# Patient Record
Sex: FEMALE | Race: NATIVE HAWAIIAN OR OTHER PACIFIC ISLANDER
[De-identification: names, ages, dates, MRNs, and addresses within clinical notes are randomized per-mention and may not be internally consistent; named-entity substitution may affect disease eponyms.]

---

## 2018-09-27 ENCOUNTER — HOSPITAL ENCOUNTER (OUTPATIENT)
Dept: HOSPITAL 14 - H.OPSURG | Age: 43
Discharge: HOME | End: 2018-09-27
Attending: OBSTETRICS & GYNECOLOGY
Payer: COMMERCIAL

## 2018-09-27 VITALS — RESPIRATION RATE: 18 BRPM

## 2018-09-27 VITALS — TEMPERATURE: 98.2 F | DIASTOLIC BLOOD PRESSURE: 58 MMHG | HEART RATE: 69 BPM | SYSTOLIC BLOOD PRESSURE: 101 MMHG

## 2018-09-27 VITALS — BODY MASS INDEX: 22.3 KG/M2

## 2018-09-27 VITALS — OXYGEN SATURATION: 98 %

## 2018-09-27 DIAGNOSIS — D26.0: ICD-10-CM

## 2018-09-27 DIAGNOSIS — N80.0: ICD-10-CM

## 2018-09-27 DIAGNOSIS — N93.8: ICD-10-CM

## 2018-09-27 DIAGNOSIS — D21.9: ICD-10-CM

## 2018-09-27 DIAGNOSIS — R10.2: Primary | ICD-10-CM

## 2018-09-27 DIAGNOSIS — N80.3: ICD-10-CM

## 2018-09-27 DIAGNOSIS — K37: ICD-10-CM

## 2018-09-27 LAB
BASOPHILS # BLD AUTO: 0.1 K/UL (ref 0–0.2)
BASOPHILS NFR BLD: 0.8 % (ref 0–2)
EOSINOPHIL # BLD AUTO: 0.1 K/UL (ref 0–0.7)
EOSINOPHIL NFR BLD: 1.6 % (ref 0–4)
ERYTHROCYTE [DISTWIDTH] IN BLOOD BY AUTOMATED COUNT: 13.2 % (ref 11.5–14.5)
HGB BLD-MCNC: 13.9 G/DL (ref 12–16)
LYMPHOCYTES # BLD AUTO: 1.8 K/UL (ref 1–4.3)
LYMPHOCYTES NFR BLD AUTO: 27.3 % (ref 20–40)
MCH RBC QN AUTO: 30 PG (ref 27–31)
MCHC RBC AUTO-ENTMCNC: 34 G/DL (ref 33–37)
MCV RBC AUTO: 88.5 FL (ref 81–99)
MONOCYTES # BLD: 0.4 K/UL (ref 0–0.8)
MONOCYTES NFR BLD: 6.4 % (ref 0–10)
NEUTROPHILS # BLD: 4.1 K/UL (ref 1.8–7)
NEUTROPHILS NFR BLD AUTO: 63.9 % (ref 50–75)
NRBC BLD AUTO-RTO: 0 % (ref 0–0)
PLATELET # BLD: 336 K/UL (ref 130–400)
PMV BLD AUTO: 8.1 FL (ref 7.2–11.7)
RBC # BLD AUTO: 4.63 MIL/UL (ref 3.8–5.2)
WBC # BLD AUTO: 6.5 K/UL (ref 4.8–10.8)

## 2018-09-27 PROCEDURE — 52005 CYSTO W/URTRL CATHJ: CPT

## 2018-09-27 PROCEDURE — 36415 COLL VENOUS BLD VENIPUNCTURE: CPT

## 2018-09-27 PROCEDURE — 50949 UNLISTED LAPS PX URETER: CPT

## 2018-09-27 PROCEDURE — 86850 RBC ANTIBODY SCREEN: CPT

## 2018-09-27 PROCEDURE — 88302 TISSUE EXAM BY PATHOLOGIST: CPT

## 2018-09-27 PROCEDURE — 88305 TISSUE EXAM BY PATHOLOGIST: CPT

## 2018-09-27 PROCEDURE — 86900 BLOOD TYPING SEROLOGIC ABO: CPT

## 2018-09-27 PROCEDURE — 85025 COMPLETE CBC W/AUTO DIFF WBC: CPT

## 2018-09-27 PROCEDURE — 44970 LAPAROSCOPY APPENDECTOMY: CPT

## 2018-09-27 NOTE — PCM.OP
Operative Report





- Operative Report


Date of Surgery/Procedure: 09/27/18


Time of Surgery/Procedure: 12:00


Surgeon: Dr. Heber Mckee


Assistant: Dr. Enio Summers


Anesthesia/Sedation: general/Dr. Gonzalez


Pre-Operative Diagnosis: abdominal pain and endometriosis


Post-Operative Diagnosis: intestinal involvemnent


Indication for Surgery: as above


Operative Findings: as above


Procedure/Operation Description: 1-Excision perirectsal endometeriosis.  2-

Appendectomy.  Brief History:This 44 year old woman was already brought to the 

OR by dr. Summers when her nored inrtestinal involvement requiring intraoperative

general surgery consultation.  Description of the Procedure: The patient was 

already brought to the OR by Dr. Summers (separate dictstion). After taking 

control of the roboti console the rectal lesion was incised circumferentially 

and with blunt and sharp dissection the lesion was excised en-bloc and sent to 

pathology separatetely. The appendix was retracted anteriorly and the mesentery 

wqas dessicated with monoploar cautery to the base. The appednix was ligated 

with threee 3-0 endoloops and rtranswected and sent to pathology separately. The

operation was then tuerned back to Dr. Summers (separate dictation).


Estimated Blood Loss: 4 cc


Complications: none


Discharge & Condition: stable

## 2018-09-28 NOTE — OP
PROCEDURE DATE:  09/27/2018



SURGEON: Enio Summers MD



ASSISTANT: Heber Mckee MD 



ANESTHESIOLOGIST: Shannan Sutherland MD



ANESTHETIC: General Endo



PREOPERATIVE DIAGNOSES:

1.  Incapacitating pelvic pain.

2.  Incapacitating abdominal pain.

3.  Abnormal uterine bleeding.

4.  History of pelvic endometriosis.

5.  History of previous failed medical surgical therapy.

6.  History of severe endometriosis and pelvic adhesions.

7.  Gastrointestinal and genitourinary symptoms.

8.  Rule out interstitial cystitis.

9.  Adenomyosis.



POSTOPERATIVE DIAGNOSES:

1.  Incapacitating pelvic pain.

2.  Incapacitating abdominal pain.

3.  Abnormal uterine bleeding.

4.  History of pelvic endometriosis.

5.  History of previous failed medical surgical therapy.

6.  History of severe endometriosis and pelvic adhesions.

7.  Gastrointestinal and genitourinary symptoms.

8.  Rule out interstitial cystitis.

9.  Adenomyosis.

10.  Severe pelvic endometriosis.

11.  Ovarian adhesions.

12.  Bowel adhesions.

13.  Subseptate uterus.

14.  Mild bilateral hydroureters.



OPERATION PERFORMED:

1.  Examination under anesthesia.

2.  Video_assisted hysteroscopy.

3.  Hysteroscopic septoplasty.

4.  Robotic da Eliel laparoscopy.

5.  Enterolysis.

6.  Bilateral ureterolysis.

7.  Bilateral salpingo_ovariolytis.

8.  Multiple peritoneal biopsies and excision of endometriosis.

9.  Treatment of endometriosis.

10.  Shaving of endometriosis of the bowel.

11.  Cystoscopy.

12.  Bilateral ureteral catheterization and injection of IC-Green dye.



Dr. Mckee from General Surgery was consulted to perform 1-excision perirectal 
endometriosis 2-appendectomy procedures and he will dictate that separately.



COMPLICATIONS:  None.



SAMPLES:  



DRAINS:  



ESTIMATED BLOOD LOSS:  Minimal.



HISTORY: MD PROVIDES HISTORY



FINDINGS:  



Genitalia:  normal, external genitalia, cervix normal without lesions or polyps.
 



Hysteroscopy showed evidence of a small septum of the fundus of the uterus and 
no other lesions visualized.  



Cystoscopy was performed to rule out endometriosis of bladder mucosa and also 
interstitial cystitis, also  injury.  The bladder was normal with no evidence 
of stone, trigonitis or cystitis.  Positive jet flow visualized in both ureters.
 



Laparoscopy was normal, gallbladder was normal, liver edges appeared to be 
normal.  Ascending colon and transverse were normal.  The appendix appeared to 
be abnormal with both fibrosis and thickening.  There was evidence of severe 
adhesions, fibrosis and endometriosis of the rectovaginal septum and attachment 
of the bowel to the posterior aspect of the uterus and to both the right and 
left adnexa.  Both ovaries were severely attached to the posterior aspect of the
ureters with endometriosis and adhesions.  Fallopian tubes appeared to have some
inflammatory changes of adhesions, but overall appeared to be normal.  Both 
ovaries appeared to be involved with scar.  There was also evidence of 
endometriosis of the rectovaginal septum in right and left perirectal areas and 
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  There 
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  There was 
also evidence of mild bilateral hydroureters.



CONSENT:  The patient had been thoroughly evaluated and counseled regarding pros
and cons of the procedure, the reasonable alternative, and the possible 
complications.  She understood and accepted the risks involved.  Appropriate 
literature was provided to the patient.  The patient was in understanding that 
given her history and presurgical exam, she knows that she was a high risk and 
average patient.  She accepted all the risks involved and all the questions had 
been answered to her satisfaction.



DESCRIPTION OF PROCEDURE:  



Initiation of the case:  After adequate anesthesia was obtained, the patient was
placed in the dorsal lithotomy position with extreme care of placement of the 
patient without hyperextension or hyperflexing the hips.  At this point, the 
patient was prepped and draped, the surgeon was gowned and gloved.  A time-out 
was taken according to the hospital procedure and the procedure was started.  At
this point, we performed the cystoscopy and bilateral ureteral catheterization. 



At this point we performed cystoscopy:

A cystoscope was inserted into the bladder, under direct visualization and the 
bladder was visualized.  The bladder was free of lesions, tumors.  There was no 
evidence of interstitial cystitis, and there was only a mild amount of 
trigonitis.  At this point, both ureters were identified and appeared to be in 
normal anatomical position.  At this point, utilizing an open-ended 5-Korean 
catheter, the left ureter was catheterized all the way to the distal ureter, and
a 5 mL of IC-Green were injected into the distal ureter.  Similarly, on the 
contralateral ureter, the ureter was catheterized all the way to the distal 
ureter, and a 5 mL of IC-Green were injected into the distal ureter.  At this 
point, the stents were removed, and the hysteroscope was removed and a 16-Korean
Haynes was placed into the bladder.  



At this point, we proceeded with a hysteroscopy:

A speculum was placed in the vagina, and the anterior lip of the cervix was 
grasped. The cervix was dilated and a hysteroscope was inserted into the cavity.
The cavity appeared to be of normal size, but there was evidence of a subseptum 
at the top of the uterus.  At this point, we proceeded with excision of uterine 
septum.  Once the septum was identified, the endoscopic scissors were inserted 
into the uterine cavity and a progressive dissection of the septum was performed
with great care not to perforate the uterus and not to cause any bleeding.  The 
procedure was basically bloodless and the septum was excised.  



At this point, we proceeded with placement of trocars and docking of the Da 
Eliel Xi robot

 The surgeons were re-gowned and re-gloved, and an open laparoscopy was 
performed by making an incision below the umbilicus, and the fascia was incised,
and the peritoneum was entered in the blunt fashion.  The cannula was inserted 
and the abdomen was insufflated, and under direct visualization 3 additional 
ports were inserted, left upper quadrant, left mid quadrant and right upper 
quadrant.  At this point, the da Eliel Xi robot was brought into the field and 
docked, and the instruments were inserted under direct visualization.  With 
extreme care not to injure the bowel or any other area.  As per the dictation, 
the upper abdomen appeared to be normal with no evidence of any lesions.  The 
pelvis had the findings described above, which included significant adhesions, 
fibrosis of the posterior cul-de-sac, significant endometriosis with deep 
endometriosis nodules.  Both ovary adherent to both the ovarian fossas and the 
posterior aspect of the uterus with significant inflammatory changes.  



At this point, we proceeded with the left ureterolysis.  

The ureter appeared to dilated and it was clearly identified utilizing 
fluorescent technology.  An incision was made on the peritoneum at the top of 
the pelvic brim, and incision was then carried down all the way opening the 
peritoneum and all the way down from the pelvic brim all the way down to the 
ovarian fossa extending the incision below the ovary.  It was a progressive 
dissection where the ureter was progressively lateralized and the peritoneum 
medialized, thus freeing the ureter all the way down to the crossing of the 
uterine vessels.  After this was done and the ureter was freed and lateralized 
and a large area of peritoneum, which had been opened up was excised and sent to
pathology.  At this point, after ureter had been identified, we were able to 
elevate the ovary and dissect it from the pelvic side wall in the ovarian fossa



At this point, we proceeded with the left ovariolysis.  

The ovary was gently dissected and elevated off the ovarian fossa and area of 
fibrosis of endometriosis were exposed.  



At this point, we proceeded with the right ureterolysis.  

The ureter was identified and again utilizing florescent technology, the 
retroperitoneal space was entered and a full dissection was performed entering 
the retroperitoneal space and dissecting the ureter, removing the ureter 
laterally and the peritoneum medially.  A full dissection was performed all the 
way down to the ovarian fossa, on the crossing of the uterine arteries.  A large
area of peritoneum containing endometriosis was also dissected and sent to 
Pathology. 



 At this point, we proceeded with a right ovariolysis.  

The ovary was progressively elevated, areas of deep endometriosis and 
superficial endometriosis were dissected out and the ovary was finally elevated.
 



At this point, we proceeded with a treatment of endometriosis and excision of 
endometriosis.  

On the left hand side, a large area of peritoneum, where containing 
endometriosis was excised in the ovarian fossa with the upper margin of the 
excision at the utero_ovarian ligament all the way down to the uterosacral 
ligament.  Large areas of fibrosis were identified in posterior cul-de-sac and 
the rectovaginal space was affected with endometriosis and severe fibrosis.  The
rectovaginal area was then dissected and the space was opened, and we were able 
to dissect the rectum away from the posterior aspect of the cervix.  Additional 
areas of endometriosis were dissected from the posterior aspect of the Uterus.  
At this point, we proceeded with excision of the endometriosis on the right hand
side where similarly in a full excision of endometriosis was performed by 
performing incision from starting at the right utero_ovarian ligament all the 
way down to the right uterosacral ligament.  At this point, Dr. Mckee from 
General Surgery was  called in and he performed excision perirectal 
endometriosis procedure, which she will dictate separately.  At this point, it 
was checked for hemostasis and appeared to be excellent.  All the endometriosis 
had been excised.  At this point, we performed the ablation of inflamed 
peritoneum, utilizing the J_plasma device.  There were inflammatory areas in the
posterior aspect of the uterus, which were not endometriotic, but they were 
purely inflammatory and these were not excisable, as they were not 
endometriotic.  Therefore, we proceeded with ablation of such area utilizing the
J plasma.  Once this was done, it was checked for hemostasis and appeared to be 
excellent.  The consult was handed to Dr. Mckee, who performed the 
appendectomy and he will dictate that separately.  After this was done, it was 
checked for hemostasis and appeared to be excellent.  The pelvis was irrigated. 
The da Eliel Xi robot was removed.  The abdomen desufflated.  The instruments 
were removed.  The incisions were closed in layers with 0 PDS for the fascia and
4-0 Monocryl for the skin.  The patient was awakened up and taken to recovery 
room in excellent condition.





______________

Enio Summers MD

[facility]

## 2021-08-16 ENCOUNTER — RESULT REVIEW (OUTPATIENT)
Age: 46
End: 2021-08-16

## 2021-08-17 ENCOUNTER — APPOINTMENT (OUTPATIENT)
Dept: OBGYN | Facility: CLINIC | Age: 46
End: 2021-08-17
Payer: COMMERCIAL

## 2021-08-17 PROCEDURE — 99386 PREV VISIT NEW AGE 40-64: CPT

## 2021-08-20 ENCOUNTER — TRANSCRIPTION ENCOUNTER (OUTPATIENT)
Age: 46
End: 2021-08-20

## 2021-09-30 ENCOUNTER — RESULT REVIEW (OUTPATIENT)
Age: 46
End: 2021-09-30

## 2021-09-30 ENCOUNTER — APPOINTMENT (OUTPATIENT)
Dept: MAMMOGRAPHY | Facility: IMAGING CENTER | Age: 46
End: 2021-09-30
Payer: COMMERCIAL

## 2021-09-30 ENCOUNTER — APPOINTMENT (OUTPATIENT)
Dept: ULTRASOUND IMAGING | Facility: IMAGING CENTER | Age: 46
End: 2021-09-30
Payer: COMMERCIAL

## 2021-09-30 ENCOUNTER — OUTPATIENT (OUTPATIENT)
Dept: OUTPATIENT SERVICES | Facility: HOSPITAL | Age: 46
LOS: 1 days | End: 2021-09-30
Payer: COMMERCIAL

## 2021-09-30 DIAGNOSIS — Z00.8 ENCOUNTER FOR OTHER GENERAL EXAMINATION: ICD-10-CM

## 2021-09-30 PROCEDURE — 77063 BREAST TOMOSYNTHESIS BI: CPT | Mod: 26

## 2021-09-30 PROCEDURE — 77067 SCR MAMMO BI INCL CAD: CPT

## 2021-09-30 PROCEDURE — 77067 SCR MAMMO BI INCL CAD: CPT | Mod: 26

## 2021-09-30 PROCEDURE — 77063 BREAST TOMOSYNTHESIS BI: CPT

## 2021-09-30 PROCEDURE — 76641 ULTRASOUND BREAST COMPLETE: CPT

## 2021-09-30 PROCEDURE — 76641 ULTRASOUND BREAST COMPLETE: CPT | Mod: 26,50
